# Patient Record
Sex: MALE | Race: WHITE | Employment: FULL TIME | URBAN - METROPOLITAN AREA
[De-identification: names, ages, dates, MRNs, and addresses within clinical notes are randomized per-mention and may not be internally consistent; named-entity substitution may affect disease eponyms.]

---

## 2017-03-22 ENCOUNTER — OFFICE VISIT (OUTPATIENT)
Dept: INTERNAL MEDICINE CLINIC | Facility: CLINIC | Age: 52
End: 2017-03-22

## 2017-03-22 VITALS
HEIGHT: 70 IN | SYSTOLIC BLOOD PRESSURE: 117 MMHG | DIASTOLIC BLOOD PRESSURE: 76 MMHG | HEART RATE: 73 BPM | RESPIRATION RATE: 12 BRPM | BODY MASS INDEX: 22.9 KG/M2 | TEMPERATURE: 98 F | WEIGHT: 160 LBS

## 2017-03-22 DIAGNOSIS — K46.9 HERNIA OF ABDOMINAL CAVITY: Primary | ICD-10-CM

## 2017-03-22 PROCEDURE — 99213 OFFICE O/P EST LOW 20 MIN: CPT | Performed by: INTERNAL MEDICINE

## 2017-03-22 PROCEDURE — 99212 OFFICE O/P EST SF 10 MIN: CPT | Performed by: INTERNAL MEDICINE

## 2017-03-22 NOTE — PROGRESS NOTES
Bulge r groin x 2 weeks p lifting wts    Blood pressure 117/76, pulse 73, temperature 97.8 °F (36.6 °C), temperature source Oral, resp. rate 12, height 5' 10\" (1.778 m), weight 160 lb (72.576 kg). r direct inguinal hernia, non tender  1.  Hernia of abdomi

## 2017-04-03 ENCOUNTER — OFFICE VISIT (OUTPATIENT)
Dept: SURGERY | Facility: CLINIC | Age: 52
End: 2017-04-03

## 2017-04-03 DIAGNOSIS — K40.90 LEFT INGUINAL HERNIA: Primary | ICD-10-CM

## 2017-04-03 PROCEDURE — 99244 OFF/OP CNSLTJ NEW/EST MOD 40: CPT | Performed by: SURGERY

## 2017-04-03 PROCEDURE — 99212 OFFICE O/P EST SF 10 MIN: CPT | Performed by: SURGERY

## 2017-04-03 NOTE — H&P
History and Physical      Juany Paula is a 46year old male. HPI   Patient presents with:  Consult: Patient c/o pain caused by left iguinal hernia. Onset 2 weeks, initially pain was intermittent.  Pain worsened while performing bench pressing e of Onset   • Cancer Father      Pancreatic Cancer   • Other[other] [OTHER] Father        \"BPH\"   • Other[other] [OTHER] Paternal Uncle      Per [employer]   • Diabetes Brother    • Hypertension Mother    • Hypertension Brother          ROS   FATEMEH comprehensive

## 2017-09-27 ENCOUNTER — HOSPITAL ENCOUNTER (OUTPATIENT)
Age: 52
Discharge: HOME | End: 2017-09-27
Payer: COMMERCIAL

## 2017-09-27 VITALS
DIASTOLIC BLOOD PRESSURE: 74 MMHG | RESPIRATION RATE: 18 BRPM | BODY MASS INDEX: 23.62 KG/M2 | HEART RATE: 114 BPM | WEIGHT: 165 LBS | HEIGHT: 70 IN | OXYGEN SATURATION: 100 % | SYSTOLIC BLOOD PRESSURE: 122 MMHG | TEMPERATURE: 102 F

## 2017-09-27 DIAGNOSIS — J02.0 STREPTOCOCCAL SORE THROAT: Primary | ICD-10-CM

## 2017-09-27 DIAGNOSIS — R50.9 FEVER, UNSPECIFIED FEVER CAUSE: ICD-10-CM

## 2017-09-27 LAB — S PYO AG THROAT QL: POSITIVE

## 2017-09-27 PROCEDURE — 99213 OFFICE O/P EST LOW 20 MIN: CPT

## 2017-09-27 PROCEDURE — 87430 STREP A AG IA: CPT

## 2017-09-27 PROCEDURE — 99214 OFFICE O/P EST MOD 30 MIN: CPT

## 2017-09-27 RX ORDER — AZITHROMYCIN 500 MG/1
500 TABLET, FILM COATED ORAL DAILY
Qty: 5 TABLET | Refills: 0 | Status: SHIPPED | OUTPATIENT
Start: 2017-09-27 | End: 2017-10-02

## 2017-09-27 RX ORDER — IBUPROFEN 600 MG/1
600 TABLET ORAL ONCE
Status: COMPLETED | OUTPATIENT
Start: 2017-09-27 | End: 2017-09-27

## 2017-09-27 NOTE — ED INITIAL ASSESSMENT (HPI)
2 days of sore throat, nasal congestion, and fever. C/o headache. Traveled from [facility], returned on Saturday. C/o deep, \"heavy\" cough. Denies chest pain or SOB. Usman Madison

## 2017-09-27 NOTE — ED PROVIDER NOTES
Patient presents with:  Cough/URI      HPI:     Arelis Coley is a 45year old male who presents for evaluation of a chief complaint of throat, minimal dry cough, and fevers as high as 103 for the past couple days. No difficulty swallowing.   Speech well nourished, well hydrated, no distress  SKIN: good skin turgor, no obvious rashes  NECK: supple, no adenopathy, no thyromegaly. No meningismus.   CARDIO: RRR without murmur  EXTREMITIES: no cyanosis, clubbing or edema  GI: soft, non-tender, normal bowel

## 2018-10-14 ENCOUNTER — HOSPITAL ENCOUNTER (OUTPATIENT)
Age: 53
Discharge: HOME | End: 2018-10-14
Attending: EMERGENCY MEDICINE
Payer: COMMERCIAL

## 2018-10-14 VITALS
TEMPERATURE: 98 F | WEIGHT: 160 LBS | OXYGEN SATURATION: 98 % | HEART RATE: 92 BPM | HEIGHT: 70 IN | SYSTOLIC BLOOD PRESSURE: 135 MMHG | DIASTOLIC BLOOD PRESSURE: 84 MMHG | RESPIRATION RATE: 16 BRPM | BODY MASS INDEX: 22.9 KG/M2

## 2018-10-14 DIAGNOSIS — J06.9 VIRAL UPPER RESPIRATORY TRACT INFECTION WITH COUGH: Primary | ICD-10-CM

## 2018-10-14 PROCEDURE — 99214 OFFICE O/P EST MOD 30 MIN: CPT

## 2018-10-14 PROCEDURE — 99213 OFFICE O/P EST LOW 20 MIN: CPT

## 2018-10-14 RX ORDER — BENZONATATE 200 MG/1
200 CAPSULE ORAL 3 TIMES DAILY PRN
Qty: 15 CAPSULE | Refills: 0 | Status: SHIPPED | OUTPATIENT
Start: 2018-10-14 | End: 2018-10-19

## 2018-10-14 NOTE — ED PROVIDER NOTES
Patient Seen in: [address] [facility]    History   Patient presents with:  Cough/URI    Stated Complaint: Cough    HPI  Patient complains of 10-14 days of cough. There have been no fevers or chills.   The cough is predominantly dry Vitals [10/14/18 1118]   /84   Pulse 92   Resp 16   Temp 98.1 °F (36.7 °C)   Temp src Oral   SpO2 98 %   O2 Device None (Room air)       Current:/84   Pulse 92   Temp 98.1 °F (36.7 °C) (Oral)   Resp 16   Ht 177.8 cm (5' 10\")   Wt 72.6 kg   SpO Prescribed:  Current Discharge Medication List    START taking these medications    benzonatate 200 MG Oral Cap  Take 1 capsule (200 mg total) by mouth 3 (three) times daily as needed for cough.   Qty: 15 capsule Refills: 0

## 2018-10-14 NOTE — ED INITIAL ASSESSMENT (HPI)
PATIENT ARRIVED AMBULATORY TO ROOM C/O A CONGESTED COUGH X2 WEEKS. SLIGHT NASAL CONGESTION. NO N/V/D. EASY NON LABORED RESPIRATIONS.

## 2019-04-04 ENCOUNTER — TELEPHONE (OUTPATIENT)
Dept: GASTROENTEROLOGY | Facility: CLINIC | Age: 54
End: 2019-04-04

## 2019-04-04 NOTE — TELEPHONE ENCOUNTER
Phone room/Cody, this is a pt of Dr. Mary Arambula. If pt would like to continue care with Dr. Mary Arambula then the patient should follow-up either with him or Nely. If he is transitioning care to Dr. Anamika Marti or Dr. Phi Conte and okay to keep on my schedule.

## 2019-04-11 NOTE — TELEPHONE ENCOUNTER
Nursing: It does not appear that this patient was contacted regarding which provider he plans to follow with moving forward.   Please follow-up on this nonurgently and feel free to forward to the phone room in order to contact the patient

## 2019-04-11 NOTE — TELEPHONE ENCOUNTER
Dr Cameron Lake, you last saw at [facility] 4/22/16. Pt contacted, states is having lower abdominal pain, level 2, with constipation and bloating. Denies rectal bleeding, blood in stool, food triggers. You are booked out through May. When would you like to see pt?  Than

## 2019-04-12 NOTE — TELEPHONE ENCOUNTER
GI RNs -    Fine to add on to my schedule next 3-6 weeks -- does not sound like an emergency -- may need to wait for Clevester Berto to provide openings. Could see on Monday or Tuesday afternoon or end of the day [place] office.   More openings are coming in late May

## 2019-04-15 NOTE — TELEPHONE ENCOUNTER
LMTCB-TRANSFER TO RN! Does pt want to see GI MD Dr. Mechelle Valdez from [facility] on 4/16/19? Otherwise, needs to be rescheduled with Dr. Trevor Sinclair.     -Awaiting pt c/b at this time to clarify.

## 2019-04-16 NOTE — TELEPHONE ENCOUNTER
Multiple attempts were made to reach pt w/o success. He was seen for consultation with Dr. Rubina Chavez 4/16/19 and scheduled procedure on 5/8/2019. Pt removed from [address] schedule.

## (undated) NOTE — Clinical Note
Lauren Rojas, [facility]       04/03/2017        Patient: Rafal Jean   YOB: 1965   Date of Visit: 4/3/2017       Dear  Dr. Nae Gutierrez MD,      Thank you for referring Rafal Jean to my p

## (undated) NOTE — MR AVS SNAPSHOT
[place]  [address]  [phone]               Thank you for choosing us for your health care visit with Akbar Montano MD.  We are glad to serve you and happy to provide you with this summary

## (undated) NOTE — MR AVS SNAPSHOT
[facility] [place] [address] [address] [id number]  [phone]               Thank you for choosing us for your health care visit with Shima Villafuerte MD.  We are glad to serve you and happy to provide you with this summary o Reason for Today's Visit     Hernia           Medical Issues Discussed Today     Hernia of abdominal cavity    -  Primary      Instructions and Information about Your Health     None      Allergies as of Mar 22, 2017     Penicillins     Other reaction(s):